# Patient Record
Sex: MALE | Race: WHITE | NOT HISPANIC OR LATINO | Employment: UNEMPLOYED | URBAN - NONMETROPOLITAN AREA
[De-identification: names, ages, dates, MRNs, and addresses within clinical notes are randomized per-mention and may not be internally consistent; named-entity substitution may affect disease eponyms.]

---

## 2024-03-27 ENCOUNTER — OFFICE VISIT (OUTPATIENT)
Dept: URGENT CARE | Facility: PHYSICIAN GROUP | Age: 64
End: 2024-03-27

## 2024-03-27 VITALS
OXYGEN SATURATION: 98 % | HEIGHT: 71 IN | RESPIRATION RATE: 14 BRPM | DIASTOLIC BLOOD PRESSURE: 80 MMHG | TEMPERATURE: 97.9 F | HEART RATE: 96 BPM | WEIGHT: 219 LBS | BODY MASS INDEX: 30.66 KG/M2 | SYSTOLIC BLOOD PRESSURE: 124 MMHG

## 2024-03-27 DIAGNOSIS — S01.112A LACERATION OF LEFT EYEBROW, INITIAL ENCOUNTER: ICD-10-CM

## 2024-03-27 RX ORDER — ROSUVASTATIN CALCIUM 10 MG/1
10 TABLET, COATED ORAL EVERY EVENING
COMMUNITY

## 2024-03-29 NOTE — PROGRESS NOTES
"Subjective:     Amado Win is a 63 y.o. male who presents for Head Laceration (Fell forward and hit head against some sharp rocks)      HPI  Pt presents for evaluation of a new problem.  Amado is a very pleasant 63-year-old male presents to urgent care today after sustaining a facial laceration to his right eyebrow approximately 2 hours ago.  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  As he was carrying heavy equipment he was unable to use his hands to break his fall.  Pain at this time has improved since initial injury.  Tetanus vaccination is up-to-date per patient.  He does live in [place] and records are not available to review.  ROS    PMH: No past medical history on file.  ALLERGIES: No Known Allergies  SURGHX: No past surgical history on file.  SOCHX:   Social History     Socioeconomic History    Marital status:      FH: No family history on file.      Objective:   /80   Pulse 96   Temp 36.6 °C (97.9 °F) (Temporal)   Resp 14   Ht 1.803 m (5' 11\")   Wt 99.3 kg (219 lb)   SpO2 98%   BMI 30.54 kg/m²     Physical Exam  Vitals and nursing note reviewed.   Constitutional:       General: He is not in acute distress.     Appearance: Normal appearance. He is normal weight. He is not ill-appearing or toxic-appearing.   HENT:      Head: Normocephalic.        Comments: There are 2 separate lacerations present to right eyebrow with a connecting piece of skin in between.  Lower laceration is somewhat deep with active bleeding in place.  Negative for foreign body.  Wound length is 5 cm     Right Ear: External ear normal.      Left Ear: External ear normal.      Nose: Nose normal.      Mouth/Throat:      Mouth: Mucous membranes are moist.   Eyes:      General:         Right eye: No discharge.         Left eye: No discharge.      Extraocular Movements: Extraocular movements intact.      Conjunctiva/sclera: Conjunctivae normal.      " Pupils: Pupils are equal, round, and reactive to light.   Pulmonary:      Effort: Pulmonary effort is normal.      Breath sounds: Normal breath sounds.   Abdominal:      General: Abdomen is flat.   Musculoskeletal:         General: Normal range of motion.      Cervical back: Normal range of motion and neck supple. No rigidity.   Lymphadenopathy:      Cervical: No cervical adenopathy.   Skin:     General: Skin is warm and dry.   Neurological:      General: No focal deficit present.      Mental Status: He is alert and oriented to person, place, and time. Mental status is at baseline.   Psychiatric:         Mood and Affect: Mood normal.         Behavior: Behavior normal.         Judgment: Judgment normal.         Assessment/Plan:   Assessment      1. Laceration of left eyebrow, initial encounter  Tdap =>8yo IM      Patient was educated that I am not trained in plastic surgery and having sutures performed on face could result in less than desirable cosmetic outcome.  He verbalizes understanding and would like to continue with procedure today.  The wound was thoroughly irrigated with copious amount of normal saline.   Area was then prepped in the usual sterile fashion.    Local field block was obtained with 1% Lidocaine with Epinephrine.    Wound was cleansed and debrided of as much devitalized tissue as possible.  Wound explored and found to be relatively superficial and no foreign bodies appreciated.  I approximated the wound edges and closed the laceration with 8 interrupted sutures of 6-0 ETHILON monofilament.  Area was then cleansed and dressed.    Wound care instructions and ER precautions given.   RTC in 7-10 d for wound check/suture removal.